# Patient Record
Sex: FEMALE | URBAN - METROPOLITAN AREA
[De-identification: names, ages, dates, MRNs, and addresses within clinical notes are randomized per-mention and may not be internally consistent; named-entity substitution may affect disease eponyms.]

---

## 2018-05-15 ENCOUNTER — EMERGENCY (EMERGENCY)
Facility: HOSPITAL | Age: 53
LOS: 1 days | Discharge: HOME | End: 2018-05-15
Attending: EMERGENCY MEDICINE
Payer: COMMERCIAL

## 2018-05-15 VITALS
TEMPERATURE: 98 F | HEIGHT: 64 IN | DIASTOLIC BLOOD PRESSURE: 77 MMHG | WEIGHT: 190.04 LBS | RESPIRATION RATE: 17 BRPM | HEART RATE: 68 BPM | OXYGEN SATURATION: 100 % | SYSTOLIC BLOOD PRESSURE: 125 MMHG

## 2018-05-15 VITALS
SYSTOLIC BLOOD PRESSURE: 120 MMHG | DIASTOLIC BLOOD PRESSURE: 79 MMHG | RESPIRATION RATE: 16 BRPM | OXYGEN SATURATION: 100 % | HEART RATE: 70 BPM

## 2018-05-15 PROCEDURE — 99284 EMERGENCY DEPT VISIT MOD MDM: CPT

## 2018-05-15 PROCEDURE — 99284 EMERGENCY DEPT VISIT MOD MDM: CPT | Mod: 25

## 2018-05-15 PROCEDURE — 96374 THER/PROPH/DIAG INJ IV PUSH: CPT

## 2018-05-15 RX ORDER — MECLIZINE HCL 12.5 MG
25 TABLET ORAL ONCE
Qty: 0 | Refills: 0 | Status: COMPLETED | OUTPATIENT
Start: 2018-05-15 | End: 2018-05-15

## 2018-05-15 RX ORDER — SODIUM CHLORIDE 9 MG/ML
1000 INJECTION INTRAMUSCULAR; INTRAVENOUS; SUBCUTANEOUS ONCE
Qty: 0 | Refills: 0 | Status: COMPLETED | OUTPATIENT
Start: 2018-05-15 | End: 2018-05-15

## 2018-05-15 RX ORDER — ONDANSETRON 8 MG/1
4 TABLET, FILM COATED ORAL ONCE
Qty: 0 | Refills: 0 | Status: COMPLETED | OUTPATIENT
Start: 2018-05-15 | End: 2018-05-15

## 2018-05-15 RX ORDER — MECLIZINE HCL 12.5 MG
1 TABLET ORAL
Qty: 15 | Refills: 0 | OUTPATIENT
Start: 2018-05-15 | End: 2018-05-19

## 2018-05-15 RX ORDER — ONDANSETRON 8 MG/1
1 TABLET, FILM COATED ORAL
Qty: 6 | Refills: 0 | OUTPATIENT
Start: 2018-05-15 | End: 2018-05-16

## 2018-05-15 RX ADMIN — SODIUM CHLORIDE 1000 MILLILITER(S): 9 INJECTION INTRAMUSCULAR; INTRAVENOUS; SUBCUTANEOUS at 13:29

## 2018-05-15 RX ADMIN — ONDANSETRON 4 MILLIGRAM(S): 8 TABLET, FILM COATED ORAL at 13:39

## 2018-05-15 RX ADMIN — Medication 25 MILLIGRAM(S): at 13:39

## 2018-05-15 NOTE — ED PROVIDER NOTE - OBJECTIVE STATEMENT
53 y old f history of Graves disease on synthroid ,woke up this AM with dizziness ,vertigo ,nausea and 3 episodes of vomiting ,denies fever ,chills ,headache .,mild cold symptoms last week ,No chest pain ,SOB

## 2018-05-15 NOTE — ED PROVIDER NOTE - ATTENDING CONTRIBUTION TO CARE
53 F who works at [employer], planning emergency flight, Hx of graves' disease on synthroid for years, who woke up today with episode of dizziness, with "swishing" in the head and heaviness in both ears, and 3 episodes of vomiting. Dizziness increased with movement of head. No tinnitus, but mild vertigo. + horizontal nystagmus bidirectional fatigable, no focal neuro findings. most likely due to peripheral vertigo (BPPV vs viral  labyrinthitis vs vestibular neuronitis). pt states that she had mild cold recently. Will give zofran, IVF, antivert and reassess. -ZR

## 2018-05-15 NOTE — ED PROVIDER NOTE - MEDICAL DECISION MAKING DETAILS
53 F who works at [employer], planning emergency flight, Hx of graves' disease on synthroid for years, who woke up today with episode of dizziness, with "swishing" in the head and heaviness in both ears, and 3 episodes of vomiting. Dizziness increased with movement of head. No tinnitus, but mild vertigo. + horizontal nystagmus bidirectional fatigable, no focal neuro findings. most likely due to peripheral vertigo (BPPV vs viral  labyrinthitis vs vestibular neuronitis). pt states that she had mild cold recently. Will give zofran, IVF, antivert and reassess. -ZR

## 2018-05-15 NOTE — ED PROVIDER NOTE - PLAN OF CARE
1) Please follow-up with your Primary Medical Doctor in 3-5 days. If you need to find a new physician, please call [phone]. See Dr. Moncada today.  2) Return to the Emergency Department if you experiences: fevers, chills, headache, vomiting, numbness or weakness, or symptoms that are new or recurrent.  3) If you have any questions or concerns, do not hesitate to contact us at [phone].  4) Take the meclizine as needed for dizziness. Please stay well hydrated with lots of fluids, including water and gatorade or powerade. You can take Zofran ODT every 8 hours for nausea. Please read the medication labels and be familiar with all of the warnings and precautions before taking this medication.

## 2018-05-15 NOTE — ED PROVIDER NOTE - PROGRESS NOTE DETAILS
Aguilar Zhao MD (resident): patient reassessed and has significant improvement in her symptoms. continues to neuro intact, improvement in nystagmus. TM's normal bilaterally, normal gait. has appointment w/ Dr. Moncada at 4:30 pm, attained by Dr. Boogie.

## 2018-05-15 NOTE — ED PROVIDER NOTE - CARE PLAN
Principal Discharge DX:	Vertigo  Assessment and plan of treatment:	1) Please follow-up with your Primary Medical Doctor in 3-5 days. If you need to find a new physician, please call [phone]. See Dr. Moncada today.  2) Return to the Emergency Department if you experiences: fevers, chills, headache, vomiting, numbness or weakness, or symptoms that are new or recurrent.  3) If you have any questions or concerns, do not hesitate to contact us at [phone].  4) Take the meclizine as needed for dizziness. Please stay well hydrated with lots of fluids, including water and gatorade or powerade. You can take Zofran ODT every 8 hours for nausea. Please read the medication labels and be familiar with all of the warnings and precautions before taking this medication.

## 2018-05-15 NOTE — ED ADULT NURSE NOTE - OBJECTIVE STATEMENT
53 y.o female pmh hypothyroid- on synthroid daily presenting to ED c/o dizziness, nausea and vomiting x this am. pt states she woke up this am feeling dizzy and nauseas, vomited 3-4 times. states the dizziness is made worse upon movement and exertion and better at rest but at rest still does not feel well or herself. states yesterday she felt fine and had no s/s or complaints. pt states she has been experiencing a cough and runny nose with sneezing over the passed 2 weeks but has not taken any OTC medication for it. denies any known sick contacts or recent travel. denies feeling like the room is spinning. no sob, cp, weakness, numbness, tingling, blurred vision or HA present. VS stable. verbalizes that she has not been able to tolerate PO today d/t the nausea. labs obtained. sister remains at the bedside. call bell within reach. safety maintained.

## 2018-05-16 RX ORDER — LEVOTHYROXINE SODIUM 125 MCG
1 TABLET ORAL
Qty: 0 | Refills: 0 | COMMUNITY

## 2019-04-25 NOTE — ED ADULT NURSE NOTE - NEURO WDL
Periorbital Skin Units: 0 Price (Use Numbers Only, No Special Characters Or $): 0.00 Additional Area 4 Location: chin Additional Area 3 Location: masseters Additional Area 1 Location: Total units Detail Level: Detailed Additional Area 2 Location: [facility] Consent: Written consent obtained. Risks include but not limited to lid/brow ptosis, bruising, swelling, diplopia, temporary effect, incomplete chemical denervation. Post-Care Instructions: Patient instructed to limit physical activity for 24 hours. Patient instructed not to rub or massage areas injected for 24 hours. SES Expiration Date (Month Year): 09/21 Lot #: [id number] Dilution (U/0.1 Cc): 1 Additional Area 6 Units: 5 Additional Area 6 Location: professional samples Glabellar Complex Units: 10 Additional Area 5 Location: lip Alert and oriented to person, place and time, memory intact, behavior appropriate to situation, PERRL.

## 2020-11-06 ENCOUNTER — TRANSCRIPTION ENCOUNTER (OUTPATIENT)
Age: 55
End: 2020-11-06

## 2020-11-28 ENCOUNTER — TRANSCRIPTION ENCOUNTER (OUTPATIENT)
Age: 55
End: 2020-11-28

## 2021-03-04 ENCOUNTER — TRANSCRIPTION ENCOUNTER (OUTPATIENT)
Age: 56
End: 2021-03-04

## 2021-07-30 ENCOUNTER — TRANSCRIPTION ENCOUNTER (OUTPATIENT)
Age: 56
End: 2021-07-30

## 2021-08-01 ENCOUNTER — TRANSCRIPTION ENCOUNTER (OUTPATIENT)
Age: 56
End: 2021-08-01

## 2022-08-09 ENCOUNTER — NON-APPOINTMENT (OUTPATIENT)
Age: 57
End: 2022-08-09

## 2022-12-19 ENCOUNTER — NON-APPOINTMENT (OUTPATIENT)
Age: 57
End: 2022-12-19

## 2023-01-21 ENCOUNTER — NON-APPOINTMENT (OUTPATIENT)
Age: 58
End: 2023-01-21

## 2023-12-25 ENCOUNTER — NON-APPOINTMENT (OUTPATIENT)
Age: 58
End: 2023-12-25

## 2025-09-18 ENCOUNTER — NON-APPOINTMENT (OUTPATIENT)
Age: 60
End: 2025-09-18